# Patient Record
Sex: FEMALE | Race: WHITE | NOT HISPANIC OR LATINO | Employment: PART TIME | URBAN - METROPOLITAN AREA
[De-identification: names, ages, dates, MRNs, and addresses within clinical notes are randomized per-mention and may not be internally consistent; named-entity substitution may affect disease eponyms.]

---

## 2020-01-28 ENCOUNTER — OFFICE VISIT CONVERTED (OUTPATIENT)
Dept: FAMILY MEDICINE CLINIC | Facility: CLINIC | Age: 45
End: 2020-01-28
Attending: NURSE PRACTITIONER

## 2020-01-31 ENCOUNTER — HOSPITAL ENCOUNTER (OUTPATIENT)
Dept: URGENT CARE | Facility: CLINIC | Age: 45
Discharge: HOME | End: 2020-01-31
Attending: EMERGENCY MEDICINE

## 2020-02-01 ENCOUNTER — HOSPITAL ENCOUNTER (OUTPATIENT)
Dept: FAMILY MEDICINE CLINIC | Facility: CLINIC | Age: 45
Discharge: HOME | End: 2020-02-01

## 2020-02-01 ENCOUNTER — OFFICE VISIT CONVERTED (OUTPATIENT)
Dept: FAMILY MEDICINE CLINIC | Facility: CLINIC | Age: 45
End: 2020-02-01
Attending: FAMILY MEDICINE

## 2020-02-01 LAB
25(OH)D3 SERPL-MCNC: 36.3 NG/ML (ref 30–100)
ALBUMIN SERPL-MCNC: 3.8 G/DL (ref 3.5–5)
ALBUMIN/GLOB SERPL: 1.3 {RATIO} (ref 1.4–2.6)
ALP SERPL-CCNC: 59 U/L (ref 42–98)
ALT SERPL-CCNC: 18 U/L (ref 10–40)
ANION GAP SERPL CALC-SCNC: 17 MMOL/L (ref 8–19)
APPEARANCE UR: CLEAR
AST SERPL-CCNC: 18 U/L (ref 15–50)
BASOPHILS # BLD AUTO: 0.08 10*3/UL (ref 0–0.2)
BASOPHILS NFR BLD AUTO: 1.5 % (ref 0–3)
BILIRUB SERPL-MCNC: 0.59 MG/DL (ref 0.2–1.3)
BILIRUB UR QL: NEGATIVE
BUN SERPL-MCNC: 10 MG/DL (ref 5–25)
BUN/CREAT SERPL: 14 {RATIO} (ref 6–20)
CALCIUM SERPL-MCNC: 9.2 MG/DL (ref 8.7–10.4)
CHLORIDE SERPL-SCNC: 105 MMOL/L (ref 99–111)
CHOLEST SERPL-MCNC: 193 MG/DL (ref 107–200)
CHOLEST/HDLC SERPL: 5.5 {RATIO} (ref 3–6)
COLOR UR: YELLOW
CONV ABS IMM GRAN: 0.01 10*3/UL (ref 0–0.2)
CONV CO2: 22 MMOL/L (ref 22–32)
CONV COLLECTION SOURCE (UA): NORMAL
CONV IMMATURE GRAN: 0.2 % (ref 0–1.8)
CONV TOTAL PROTEIN: 6.7 G/DL (ref 6.3–8.2)
CONV UROBILINOGEN IN URINE BY AUTOMATED TEST STRIP: 0.2 {EHRLICHU}/DL (ref 0.1–1)
CREAT UR-MCNC: 0.74 MG/DL (ref 0.5–0.9)
DEPRECATED RDW RBC AUTO: 42.9 FL (ref 36.4–46.3)
EOSINOPHIL # BLD AUTO: 0.13 10*3/UL (ref 0–0.7)
EOSINOPHIL # BLD AUTO: 2.4 % (ref 0–7)
ERYTHROCYTE [DISTWIDTH] IN BLOOD BY AUTOMATED COUNT: 12.5 % (ref 11.7–14.4)
EST. AVERAGE GLUCOSE BLD GHB EST-MCNC: 105 MG/DL
GFR SERPLBLD BASED ON 1.73 SQ M-ARVRAT: >60 ML/MIN/{1.73_M2}
GLOBULIN UR ELPH-MCNC: 2.9 G/DL (ref 2–3.5)
GLUCOSE SERPL-MCNC: 87 MG/DL (ref 65–99)
GLUCOSE UR QL: NEGATIVE MG/DL
HBA1C MFR BLD: 5.3 % (ref 3.5–5.7)
HCT VFR BLD AUTO: 39.1 % (ref 37–47)
HDLC SERPL-MCNC: 35 MG/DL (ref 40–60)
HGB BLD-MCNC: 13.1 G/DL (ref 12–16)
HGB UR QL STRIP: NEGATIVE
KETONES UR QL STRIP: NEGATIVE MG/DL
LDLC SERPL CALC-MCNC: 114 MG/DL (ref 70–100)
LEUKOCYTE ESTERASE UR QL STRIP: NEGATIVE
LYMPHOCYTES # BLD AUTO: 1.82 10*3/UL (ref 1–5)
LYMPHOCYTES NFR BLD AUTO: 33.1 % (ref 20–45)
MCH RBC QN AUTO: 31.6 PG (ref 27–31)
MCHC RBC AUTO-ENTMCNC: 33.5 G/DL (ref 33–37)
MCV RBC AUTO: 94.2 FL (ref 81–99)
MONOCYTES # BLD AUTO: 0.38 10*3/UL (ref 0.2–1.2)
MONOCYTES NFR BLD AUTO: 6.9 % (ref 3–10)
NEUTROPHILS # BLD AUTO: 3.08 10*3/UL (ref 2–8)
NEUTROPHILS NFR BLD AUTO: 55.9 % (ref 30–85)
NITRITE UR QL STRIP: NEGATIVE
NRBC CBCN: 0 % (ref 0–0.7)
OSMOLALITY SERPL CALC.SUM OF ELEC: 288 MOSM/KG (ref 273–304)
PH UR STRIP.AUTO: 5 [PH] (ref 5–8)
PLATELET # BLD AUTO: 332 10*3/UL (ref 130–400)
PMV BLD AUTO: 9.8 FL (ref 9.4–12.3)
POTASSIUM SERPL-SCNC: 3.9 MMOL/L (ref 3.5–5.3)
PROT UR QL: NEGATIVE MG/DL
RBC # BLD AUTO: 4.15 10*6/UL (ref 4.2–5.4)
SODIUM SERPL-SCNC: 140 MMOL/L (ref 135–147)
SP GR UR: 1.02 (ref 1–1.03)
T4 FREE SERPL-MCNC: 0.7 NG/DL (ref 0.9–1.8)
TRIGL SERPL-MCNC: 218 MG/DL (ref 40–150)
TSH SERPL-ACNC: 2.94 M[IU]/L (ref 0.27–4.2)
VLDLC SERPL-MCNC: 44 MG/DL (ref 5–37)
WBC # BLD AUTO: 5.5 10*3/UL (ref 4.8–10.8)

## 2020-03-20 ENCOUNTER — HOSPITAL ENCOUNTER (OUTPATIENT)
Dept: SLEEP MEDICINE | Facility: HOSPITAL | Age: 45
Discharge: HOME | End: 2020-03-20
Attending: INTERNAL MEDICINE

## 2020-05-05 ENCOUNTER — CONVERSION ENCOUNTER (OUTPATIENT)
Dept: FAMILY MEDICINE CLINIC | Facility: CLINIC | Age: 45
End: 2020-05-05

## 2020-05-05 ENCOUNTER — OFFICE VISIT CONVERTED (OUTPATIENT)
Dept: FAMILY MEDICINE CLINIC | Facility: CLINIC | Age: 45
End: 2020-05-05
Attending: FAMILY MEDICINE

## 2020-07-08 ENCOUNTER — HOSPITAL ENCOUNTER (OUTPATIENT)
Dept: OTHER | Facility: HOSPITAL | Age: 45
Discharge: HOME | End: 2020-07-08

## 2020-07-13 LAB
CONV QUANTIFERON TB GOLD: NEGATIVE
QUANTIFERON CRITERIA: NORMAL
QUANTIFERON MITOGEN VALUE: >10 IU/ML
QUANTIFERON NIL VALUE: 0.01 IU/ML
QUANTIFERON TB1 AG VALUE: 0.09 IU/ML
QUANTIFERON TB2 AG VALUE: 0.01 IU/ML

## 2020-09-29 ENCOUNTER — HOSPITAL ENCOUNTER (OUTPATIENT)
Dept: FAMILY MEDICINE CLINIC | Facility: CLINIC | Age: 45
Discharge: HOME | End: 2020-09-29
Attending: NURSE PRACTITIONER

## 2020-10-01 LAB — SARS-COV-2 RNA SPEC QL NAA+PROBE: NOT DETECTED

## 2020-12-11 ENCOUNTER — HOSPITAL ENCOUNTER (OUTPATIENT)
Dept: SLEEP MEDICINE | Facility: HOSPITAL | Age: 45
Discharge: HOME | End: 2020-12-11
Attending: INTERNAL MEDICINE

## 2021-05-09 VITALS
DIASTOLIC BLOOD PRESSURE: 72 MMHG | SYSTOLIC BLOOD PRESSURE: 110 MMHG | TEMPERATURE: 97.7 F | BODY MASS INDEX: 41.82 KG/M2 | OXYGEN SATURATION: 96 % | HEART RATE: 90 BPM | HEIGHT: 63 IN | WEIGHT: 236 LBS

## 2021-05-09 VITALS
SYSTOLIC BLOOD PRESSURE: 120 MMHG | WEIGHT: 236 LBS | OXYGEN SATURATION: 97 % | HEART RATE: 89 BPM | TEMPERATURE: 96.7 F | DIASTOLIC BLOOD PRESSURE: 80 MMHG

## 2021-05-09 VITALS
DIASTOLIC BLOOD PRESSURE: 80 MMHG | OXYGEN SATURATION: 95 % | SYSTOLIC BLOOD PRESSURE: 120 MMHG | HEART RATE: 89 BPM | TEMPERATURE: 96.2 F

## 2021-05-14 ENCOUNTER — OFFICE VISIT CONVERTED (OUTPATIENT)
Dept: FAMILY MEDICINE CLINIC | Facility: CLINIC | Age: 46
End: 2021-05-14
Attending: FAMILY MEDICINE

## 2021-05-14 ENCOUNTER — HOSPITAL ENCOUNTER (OUTPATIENT)
Dept: FAMILY MEDICINE CLINIC | Facility: CLINIC | Age: 46
Discharge: HOME | End: 2021-05-14
Attending: FAMILY MEDICINE

## 2021-05-14 LAB
ALBUMIN SERPL-MCNC: 4.4 G/DL (ref 3.5–5)
ALBUMIN/GLOB SERPL: 1.5 {RATIO} (ref 1.4–2.6)
ALP SERPL-CCNC: 60 U/L (ref 42–98)
ALT SERPL-CCNC: 29 U/L (ref 10–40)
ANION GAP SERPL CALC-SCNC: 15 MMOL/L (ref 8–19)
AST SERPL-CCNC: 22 U/L (ref 15–50)
BASOPHILS # BLD AUTO: 0.06 10*3/UL (ref 0–0.2)
BASOPHILS NFR BLD AUTO: 1 % (ref 0–3)
BILIRUB SERPL-MCNC: 0.36 MG/DL (ref 0.2–1.3)
BUN SERPL-MCNC: 7 MG/DL (ref 5–25)
BUN/CREAT SERPL: 8 {RATIO} (ref 6–20)
CALCIUM SERPL-MCNC: 8.9 MG/DL (ref 8.7–10.4)
CHLORIDE SERPL-SCNC: 105 MMOL/L (ref 99–111)
CHOLEST SERPL-MCNC: 213 MG/DL (ref 107–200)
CHOLEST/HDLC SERPL: 4.6 {RATIO} (ref 3–6)
CONV ABS IMM GRAN: 0.02 10*3/UL (ref 0–0.2)
CONV CO2: 23 MMOL/L (ref 22–32)
CONV IMMATURE GRAN: 0.3 % (ref 0–1.8)
CONV TOTAL PROTEIN: 7.3 G/DL (ref 6.3–8.2)
CREAT UR-MCNC: 0.86 MG/DL (ref 0.5–0.9)
CRP SERPL-MCNC: 9.3 MG/L (ref 0–5)
DEPRECATED RDW RBC AUTO: 42.3 FL (ref 36.4–46.3)
EOSINOPHIL # BLD AUTO: 0.19 10*3/UL (ref 0–0.7)
EOSINOPHIL # BLD AUTO: 3.1 % (ref 0–7)
ERYTHROCYTE [DISTWIDTH] IN BLOOD BY AUTOMATED COUNT: 12.8 % (ref 11.7–14.4)
EST. AVERAGE GLUCOSE BLD GHB EST-MCNC: 108 MG/DL
GFR SERPLBLD BASED ON 1.73 SQ M-ARVRAT: >60 ML/MIN/{1.73_M2}
GLOBULIN UR ELPH-MCNC: 2.9 G/DL (ref 2–3.5)
GLUCOSE SERPL-MCNC: 98 MG/DL (ref 65–99)
HBA1C MFR BLD: 5.4 % (ref 3.5–5.7)
HCT VFR BLD AUTO: 39.1 % (ref 37–47)
HDLC SERPL-MCNC: 46 MG/DL (ref 40–60)
HGB BLD-MCNC: 13.3 G/DL (ref 12–16)
LDLC SERPL CALC-MCNC: 130 MG/DL (ref 70–100)
LYMPHOCYTES # BLD AUTO: 2.22 10*3/UL (ref 1–5)
LYMPHOCYTES NFR BLD AUTO: 36 % (ref 20–45)
MCH RBC QN AUTO: 30.6 PG (ref 27–31)
MCHC RBC AUTO-ENTMCNC: 34 G/DL (ref 33–37)
MCV RBC AUTO: 89.9 FL (ref 81–99)
MONOCYTES # BLD AUTO: 0.62 10*3/UL (ref 0.2–1.2)
MONOCYTES NFR BLD AUTO: 10.1 % (ref 3–10)
NEUTROPHILS # BLD AUTO: 3.05 10*3/UL (ref 2–8)
NEUTROPHILS NFR BLD AUTO: 49.5 % (ref 30–85)
NRBC CBCN: 0 % (ref 0–0.7)
OSMOLALITY SERPL CALC.SUM OF ELEC: 286 MOSM/KG (ref 273–304)
PLATELET # BLD AUTO: 291 10*3/UL (ref 130–400)
PMV BLD AUTO: 10 FL (ref 9.4–12.3)
POTASSIUM SERPL-SCNC: 3.9 MMOL/L (ref 3.5–5.3)
RBC # BLD AUTO: 4.35 10*6/UL (ref 4.2–5.4)
SODIUM SERPL-SCNC: 139 MMOL/L (ref 135–147)
T4 FREE SERPL-MCNC: 0.8 NG/DL (ref 0.9–1.8)
TRIGL SERPL-MCNC: 183 MG/DL (ref 40–150)
TSH SERPL-ACNC: 2.55 M[IU]/L (ref 0.27–4.2)
VLDLC SERPL-MCNC: 37 MG/DL (ref 5–37)
WBC # BLD AUTO: 6.16 10*3/UL (ref 4.8–10.8)

## 2021-05-16 LAB
CONV CELIAC DISEASE AB-IGA: 242 MG/DL (ref 68–408)
HCV AB SER DONR QL: <0.1 S/CO RATIO (ref 0–0.9)

## 2021-05-17 LAB — TTG IGA SER-ACNC: <2 U/ML (ref 0–3)

## 2021-05-18 ENCOUNTER — HOSPITAL ENCOUNTER (OUTPATIENT)
Dept: FAMILY MEDICINE CLINIC | Facility: CLINIC | Age: 46
Discharge: HOME | End: 2021-05-18
Attending: FAMILY MEDICINE

## 2021-06-05 NOTE — PROGRESS NOTES
Progress Note      Patient Name: Thalia Sandhu   Patient ID: [id number]   Sex: Female   YOB: 1975    Primary Care Provider: Mimi RAYA   Referring Provider: Mimi RAYA    Visit Date: May 14, 2021    Provider: James Vasquez DO   Location: [facility]   Location Address: [address] [place] [address]   [place]  [address]   Location Phone: [phone]          Chief Complaint     Wanting labs       History Of Present Illness  Thalia Sandhu is a 45 year old /White female who presents for evaluation and treatment of:      1. ) GI SXS : Patient presents with complaints of intermittent bouts of diarrhea and constipation. She reports that she is concerned for IBS. She denies any significant sxs at this time. She reports associated abdominal pain during episodes. She is also requesting labs evaluating her thyroid due to her daughter recently being diagnosed with hypothyroidism.     *Requesting a re-referral to dermatology - history of psoriasis - previously followed by [facility].            Past Medical History  Disease Name Date Onset Notes   Anemia --  --    Anxiety --  --    Depression --  --    GERD (gastroesophageal reflux disease) --  --    Migraines --  --    Psoriatic arthritis --  --    Screening Mammogram 06-18-19 [place] sent date of mammo, but no report   Sleep apnea --  --    Stomach ulcer --  --          Past Surgical History  Procedure Name Date Notes   Appendectomy --  --    Endometrial ablation --  --          Medication List  Name Date Started Instructions   fluoxetine 20 mg oral capsule  take 1 capsule (20 mg) by oral route once daily in the evening   hydroxyzine HCl 50 mg oral tablet  take 1 tablet (50 mg) by oral route 4 times per day   olanzapine 10 mg oral tablet  take 1 tablet (10 mg) by oral route once daily   omeprazole 20 mg oral capsule,delayed release(/EC) 05/13/2020 take 1 capsule (20 mg) by oral route  "once daily 30 minutes to 1 hour before a meal for 90 days         Allergy List  Allergen Name Date Reaction Notes   NO KNOWN DRUG ALLERGIES --  --  --          Family Medical History  Disease Name Relative/Age Notes   Hyperlipidemia Mother/   Mother   Hypertension Mother/   Mother   Mother, Grandmother, or Sister developed Heart Disease before the age of 65  --          Social History  Finding Status Start/Stop Quantity Notes   Alcohol Light --/-- --  drank alcohol in the past - rare   Electronic cigarette use --  --/-- --  --    Exercises regularly --  --/-- --  3-4 times per week   Recreational Drug Use Never --/-- --  never used   Second hand smoke exposure Unknown --/-- --  no   Tobacco Former --/-- --  for 20 years, currently uses other tobacco products - quit smoking 6-7 years ago   Uses seatbelts --  --/-- --  yes         Review of Systems  · Constitutional  o Denies  o : fatigue, night sweats  · Eyes  o Denies  o : double vision, blurred vision  · HENT  o Denies  o : vertigo, recent head injury  · Cardiovascular  o Denies  o : chest pain, irregular heart beats  · Respiratory  o Denies  o : shortness of breath, productive cough  · Gastrointestinal  o Denies  o : diarrhea, abdominal pain, nausea, vomiting   · Genitourinary  o Denies  o : dysuria, urinary retention  · Integument  o Denies  o : hair growth change, new skin lesions  · Neurologic  o Denies  o : altered mental status, seizures  · Musculoskeletal  o Denies  o : joint swelling, limitation of motion  · Endocrine  o Denies  o : cold intolerance, heat intolerance  · Psychiatric  o Denies  o : hallucinations, delusions  · Heme-Lymph  o Denies  o : petechiae, lymph node enlargement or tenderness  · Allergic-Immunologic  o Denies  o : frequent illnesses      Vitals  Date Time BP Position Site L\R Cuff Size HR RR TEMP (F) WT  HT  BMI kg/m2 BSA m2 O2 Sat FR L/min FiO2 HC       05/14/2021 03:48 /78 Sitting    79 - R  98 245lbs 0oz 5'  3\" 43.4 2.22 96 %  " 21%          Physical Examination  · Constitutional  o Appearance  o : alert, in no acute distress  · Head and Face  o HEENT  o : hearing is normal for conversation   · Eyes  o Conjunctivae  o : conjunctivae normal  o Pupils and Irises  o : pupils equal and round  · Neck  o Inspection/Palpation  o : supple  o Thyroid  o : no thyromegaly  · Respiratory  o Respiratory Effort  o : breathing unlabored  o Auscultation of Lungs  o : clear to ascultation  · Cardiovascular  o Heart  o :   § Auscultation of Heart  § : regular rate and rhythm  o Peripheral Vascular System  o :   § Extremities  § : no edema  · Lymphatic  o Neck  o : no lymphadenopathy present  · Musculoskeletal  o General  o :   § General Musculoskeletal  § : No joint swelling or deformity.  · Skin and Subcutaneous Tissue  o General Inspection  o : no lesions present, no areas of discoloration, skin turgor normal, texture normal  · Neurologic  o Gait and Station  o :   § Gait Screening  § : normal gait  · Psychiatric  o Mood and Affect  o : mood normal, affect appropriate          Assessment  · Irritable bowel     564.1/K58.9  · Psoriasis     696.1/L40.9  · Stage 1 hypertension     401.9/I10  · Screening for thyroid disorder     V77.0/Z13.29  · Screening for lipid disorders     V77.91/Z13.220  · Screening for diabetes mellitus     V77.1/Z13.1  · Encounter for hepatitis C screening test for low risk patient     V73.89/Z11.59  · Morbid obesity     278.01/E66.01         A.) Labs as noted. Referral to dermatology as noted. Advised that if labs are negative - we will consider a referral to GI.            Plan  · Orders  o CBC with Auto Diff [facility] (06002) - 401.9/I10 - 05/14/2021  o CMP [facility] (71642) - 401.9/I10 - 05/14/2021  o Hgb A1c [facility] (68384) - V77.1/Z13.1 - 05/14/2021  o Lipid Panel [facility] (55692) - V77.91/Z13.220 - 05/14/2021  o Thyroid Profile (THYII) - V77.0/Z13.29 - 05/14/2021  o ACO-39: Current medications updated and reviewed (0285F, ) - -  05/14/2021  o Celiac disease antibody panel (53715) - 564.1/K58.9 - 05/14/2021  o Hepatitis C antibody (85450) - V73.89/Z11.59 - 05/14/2021  o CRP (Inflammation) [facility] (60979) - 564.1/K58.9 - 05/14/2021  o DERMATOLOGY CONSULTATION (DERMA) - 696.1/L40.9 - 05/14/2021   Patient would like to be referred to [facility] if possible.   o Lactoferrin, fecal (25141) - 564.1/K58.9 - 05/14/2021  · Medications  o Medications have been Reconciled  o Transition of Care or Provider Policy  · Instructions  o Patient was educated/instructed on their diagnosis, treatment and medications prior to discharge from the clinic today.            Electronically Signed by: James Vasquez DO - on May 14, 2021 04:42:41 PM

## 2021-06-21 PROBLEM — K21.9 GERD (GASTROESOPHAGEAL REFLUX DISEASE): Status: ACTIVE | Noted: 2021-06-21

## 2021-06-21 PROBLEM — G47.30 SLEEP APNEA: Status: ACTIVE | Noted: 2021-06-21

## 2021-06-21 PROBLEM — D64.9 ANEMIA: Status: ACTIVE | Noted: 2021-06-21

## 2021-06-21 PROBLEM — G43.909 MIGRAINES: Status: ACTIVE | Noted: 2021-06-21

## 2021-06-21 PROBLEM — F41.9 ANXIETY: Status: ACTIVE | Noted: 2021-06-21

## 2021-06-21 PROBLEM — L40.50 PSORIASIS WITH ARTHROPATHY (HCC): Status: ACTIVE | Noted: 2021-06-21

## 2021-06-21 PROBLEM — F32.A DEPRESSION: Status: ACTIVE | Noted: 2021-06-21

## 2021-06-21 PROBLEM — Z12.31 VISIT FOR SCREENING MAMMOGRAM: Status: ACTIVE | Noted: 2019-06-18

## 2021-06-21 RX ORDER — OLANZAPINE 10 MG/1
TABLET ORAL
COMMUNITY

## 2021-06-21 RX ORDER — FLUOXETINE HYDROCHLORIDE 20 MG/1
CAPSULE ORAL
COMMUNITY

## 2021-06-21 RX ORDER — HYDROXYZINE 50 MG/1
TABLET, FILM COATED ORAL
COMMUNITY

## 2021-06-23 ENCOUNTER — OFFICE VISIT (OUTPATIENT)
Dept: GASTROENTEROLOGY | Facility: CLINIC | Age: 46
End: 2021-06-23

## 2021-06-23 VITALS
WEIGHT: 240 LBS | DIASTOLIC BLOOD PRESSURE: 62 MMHG | HEART RATE: 72 BPM | SYSTOLIC BLOOD PRESSURE: 111 MMHG | HEIGHT: 62 IN | BODY MASS INDEX: 44.16 KG/M2

## 2021-06-23 DIAGNOSIS — R19.7 DIARRHEA, UNSPECIFIED TYPE: Primary | ICD-10-CM

## 2021-06-23 DIAGNOSIS — Z86.010 HISTORY OF COLON POLYPS: ICD-10-CM

## 2021-06-23 PROBLEM — N93.9 ABNORMAL UTERINE BLEEDING (AUB): Status: ACTIVE | Noted: 2019-09-23

## 2021-06-23 PROBLEM — Z09 POSTOP CHECK: Status: ACTIVE | Noted: 2019-11-05

## 2021-06-23 PROBLEM — N92.0 MENORRHAGIA: Status: ACTIVE | Noted: 2019-08-14

## 2021-06-23 PROCEDURE — 99204 OFFICE O/P NEW MOD 45 MIN: CPT | Performed by: NURSE PRACTITIONER

## 2021-06-23 RX ORDER — SOD SULF/POT CHLORIDE/MAG SULF 1.479 G
188 TABLET ORAL CONTINUOUS
Qty: 24 TABLET | Refills: 0 | Status: ON HOLD | OUTPATIENT
Start: 2021-06-23 | End: 2021-10-28

## 2021-06-23 RX ORDER — DICYCLOMINE HYDROCHLORIDE 10 MG/1
10 CAPSULE ORAL 4 TIMES DAILY PRN
Qty: 120 CAPSULE | Refills: 3 | Status: SHIPPED | OUTPATIENT
Start: 2021-06-23 | End: 2021-09-20 | Stop reason: SDUPTHER

## 2021-06-23 RX ORDER — GUSELKUMAB 100 MG/ML
INJECTION SUBCUTANEOUS
COMMUNITY
Start: 2021-04-19

## 2021-06-23 RX ORDER — HYDROXYZINE PAMOATE 50 MG/1
CAPSULE ORAL
COMMUNITY
Start: 2021-06-18

## 2021-06-23 RX ORDER — USTEKINUMAB 90 MG/ML
INJECTION, SOLUTION SUBCUTANEOUS
COMMUNITY
Start: 2021-03-22

## 2021-06-23 NOTE — PROGRESS NOTES
"Patient Name: Thalia Sandhu   Visit Date: 2021   Patient ID: [id number]  Provider: ELVER Barry    Sex: female  Location:  Location Address:  Location Phone: [address] [place] [address] [phone]    YOB: 1975      Primary Care Provider Mimi Bush APRN      Referring Provider: No ref. provider found        Chief Complaint  Irritable Bowel Syndrome    History of Present Illness  Thalia Sandhu is a 46 y.o. who presents to [facility] on referral from No ref. provider found for a gastroenterology evaluation of Irritable Bowel Syndrome.    Ms. Sandhu reports that she has been dealing with IBS mixed with constipation and diarrhea for many years now.  She is currently in a \"diarrhea state\" as she is having a bowel movement 2-3 times daily, loose stool. Fecal urgency present with occasional episodes of fecal incontinence.   Has tried fiber, miralax, and many OTC teas in the past however no relief.  Abdominal cramping waxes and wanes.  Cramping is worse whenever she feels constipated. Denies any hematochezia, melena, or family hx of colon cancer.  Appetite and weight stable.    Last colonoscopy was 8 years ago in [place], unsure hospital name.  Reports that she had colon polyps and was told she needed a repeat in 5 years.    Labs Result Review Imaging    Past Medical History:   Diagnosis Date   • Irritable bowel syndrome        Past Surgical History:   Procedure Laterality Date   • ABLATION COLPOCLESIS     • APPENDECTOMY     •  SECTION           Current Outpatient Medications:   •  FLUoxetine (PROzac) 20 MG capsule, fluoxetine 20 mg oral capsule take 1 capsule (20 mg) by oral route once daily in the evening   Active, Disp: , Rfl:   •  hydrOXYzine (ATARAX) 50 MG tablet, hydroxyzine HCl 50 mg oral tablet take 1 tablet (50 mg) by oral route 4 times per day   Active, Disp: , Rfl:   •  OLANZapine (zyPREXA) 10 MG tablet, " "olanzapine 10 mg oral tablet take 1 tablet (10 mg) by oral route once daily   Active, Disp: , Rfl:   •  Tremfya 100 MG/ML solution pen-injector, , Disp: , Rfl:   •  hydrOXYzine pamoate (VISTARIL) 50 MG capsule, TAKE 1 CAPSULE BY MOUTH EVERY 6 HOURS AS NEEDED, Disp: , Rfl:   •  Sodium Sulfate-Mag Sulfate-KCl (Sutab) 6005-338-455 MG tablet, Take 188 mg by mouth Continuous., Disp: 24 tablet, Rfl: 0  •  Stelara 90 MG/ML solution prefilled syringe Injection, , Disp: , Rfl:      No Known Allergies    History reviewed. No pertinent family history.     Social History     Social History Narrative   • Not on file         Objective     Review of Systems   Gastrointestinal: Positive for diarrhea. Negative for anal bleeding and blood in stool.        Vital Signs:   /62   Pulse 72   Ht 157.5 cm (62\")   Wt 109 kg (240 lb)   BMI 43.90 kg/m²       Physical Exam  Constitutional:       General: She is not in acute distress.     Appearance: Normal appearance. She is well-developed and normal weight.   HENT:      Head: Normocephalic and atraumatic.   Eyes:      Conjunctiva/sclera: Conjunctivae normal.      Pupils: Pupils are equal, round, and reactive to light.      Visual Fields: Right eye visual fields normal and left eye visual fields normal.   Cardiovascular:      Rate and Rhythm: Normal rate and regular rhythm.      Heart sounds: Normal heart sounds.   Pulmonary:      Effort: Pulmonary effort is normal. No retractions.      Breath sounds: Normal breath sounds and air entry.   Abdominal:      General: Bowel sounds are normal. There is no distension.      Palpations: Abdomen is soft.      Tenderness: There is no abdominal tenderness.      Comments: No appreciable hepatosplenomegaly or ascites   Musculoskeletal:         General: Normal range of motion.      Cervical back: Normal range of motion and neck supple.   Skin:     General: Skin is warm and dry.   Neurological:      Mental Status: She is alert and oriented to person, " place, and time.   Psychiatric:         Mood and Affect: Mood and affect normal.         Behavior: Behavior normal.         Result Review :   The following data was reviewed by: ELVER Barry on 06/23/2021:  CMP    CMP 5/14/21   Glucose 98   BUN 7   Creatinine 0.86   Sodium 139   Potassium 3.9   Chloride 105   Calcium 8.9   Albumin 4.4   Total Bilirubin 0.36   Alkaline Phosphatase 60   AST (SGOT) 22   ALT (SGPT) 29           CBC w/diff    CBC w/Diff 5/14/21   WBC 6.16   RBC 4.35   Hemoglobin 13.3   Hematocrit 39.1   MCV 89.9   MCH 30.6   MCHC 34.0   RDW 12.8   Platelets 291   Neutrophil Rel % 49.5   Lymphocyte Rel % 36.0   Monocyte Rel % 10.1 (A)   Eosinophil Rel % 3.1   Basophil Rel % 1.0   (A) Abnormal value                    Assessment and Plan    Diagnoses and all orders for this visit:    1. Diarrhea, unspecified type (Primary)  -     Clostridium Difficile Toxin - Stool, Per Rectum; Future  -     Fecal Lactoferrin - Stool, Per Rectum; Future  -     Gastrointestinal Panel, PCR - Stool, Per Rectum; Future  -     Pancreatic Elastase, Fecal - Stool, Per Rectum; Future  -     Case Request; Standing  -     Case Request    2. History of colon polyps  -     Clostridium Difficile Toxin - Stool, Per Rectum; Future  -     Fecal Lactoferrin - Stool, Per Rectum; Future  -     Gastrointestinal Panel, PCR - Stool, Per Rectum; Future  -     Pancreatic Elastase, Fecal - Stool, Per Rectum; Future  -     Case Request; Standing  -     Case Request    Other orders  -     Follow Anesthesia Guidelines / Protocol; Future  -     Obtain Informed Consent; Future  -     Follow Anesthesia Guidelines / Protocol; Standing  -     Obtain Informed Consent; Standing  -     Verify NPO; Standing  -     Verify Bowel Prep Was Successful; Standing  -     Give Tap Water Enema If Bowel Prep Insufficient; Standing  -     Sodium Sulfate-Mag Sulfate-KCl (Sutab) 7076-769-166 MG tablet; Take 188 mg by mouth Continuous.  Dispense: 24 tablet;  Refill: 0      COLONOSCOPY (N/A)       Follow Up   Return for Follow up after procedure.  Patient was given instructions and counseling regarding her condition or for health maintenance advice. Please see specific information pulled into the AVS if appropriate.

## 2021-07-08 ENCOUNTER — LAB (OUTPATIENT)
Dept: LAB | Facility: HOSPITAL | Age: 46
End: 2021-07-08

## 2021-07-08 DIAGNOSIS — R19.7 DIARRHEA, UNSPECIFIED TYPE: ICD-10-CM

## 2021-07-08 DIAGNOSIS — Z86.010 HISTORY OF COLON POLYPS: ICD-10-CM

## 2021-07-08 LAB
027 TOXIN: NORMAL
C DIFF TOX GENS STL QL NAA+PROBE: NEGATIVE
LACTOFERRIN STL QL LA: NEGATIVE

## 2021-07-08 PROCEDURE — 82656 EL-1 FECAL QUAL/SEMIQ: CPT

## 2021-07-08 PROCEDURE — 83630 LACTOFERRIN FECAL (QUAL): CPT

## 2021-07-08 PROCEDURE — 87493 C DIFF AMPLIFIED PROBE: CPT

## 2021-07-13 LAB — ELASTASE PANC STL-MCNT: >500 UG ELAST./G

## 2021-07-15 VITALS
TEMPERATURE: 98 F | HEART RATE: 79 BPM | SYSTOLIC BLOOD PRESSURE: 134 MMHG | DIASTOLIC BLOOD PRESSURE: 78 MMHG | BODY MASS INDEX: 43.41 KG/M2 | WEIGHT: 245 LBS | HEIGHT: 63 IN | OXYGEN SATURATION: 96 %

## 2021-08-13 ENCOUNTER — LAB (OUTPATIENT)
Dept: LAB | Facility: HOSPITAL | Age: 46
End: 2021-08-13

## 2021-08-13 ENCOUNTER — TRANSCRIBE ORDERS (OUTPATIENT)
Dept: ADMINISTRATIVE | Facility: HOSPITAL | Age: 46
End: 2021-08-13

## 2021-08-13 DIAGNOSIS — L40.9 PSORIASIS: Primary | ICD-10-CM

## 2021-08-13 LAB
ALBUMIN SERPL-MCNC: 4.3 G/DL (ref 3.5–5.2)
ALBUMIN/GLOB SERPL: 1.6 G/DL
ALP SERPL-CCNC: 65 U/L (ref 39–117)
ALT SERPL W P-5'-P-CCNC: 19 U/L (ref 1–33)
ANION GAP SERPL CALCULATED.3IONS-SCNC: 11.2 MMOL/L (ref 5–15)
AST SERPL-CCNC: 13 U/L (ref 1–32)
BASOPHILS # BLD AUTO: 0.07 10*3/MM3 (ref 0–0.2)
BASOPHILS NFR BLD AUTO: 1.2 % (ref 0–1.5)
BILIRUB SERPL-MCNC: 0.5 MG/DL (ref 0–1.2)
BUN SERPL-MCNC: 5 MG/DL (ref 6–20)
BUN/CREAT SERPL: 6.9 (ref 7–25)
CALCIUM SPEC-SCNC: 8.9 MG/DL (ref 8.6–10.5)
CHLORIDE SERPL-SCNC: 108 MMOL/L (ref 98–107)
CO2 SERPL-SCNC: 22.8 MMOL/L (ref 22–29)
CREAT SERPL-MCNC: 0.72 MG/DL (ref 0.57–1)
DEPRECATED RDW RBC AUTO: 45 FL (ref 37–54)
EOSINOPHIL # BLD AUTO: 0.08 10*3/MM3 (ref 0–0.4)
EOSINOPHIL NFR BLD AUTO: 1.4 % (ref 0.3–6.2)
ERYTHROCYTE [DISTWIDTH] IN BLOOD BY AUTOMATED COUNT: 13.3 % (ref 12.3–15.4)
GFR SERPL CREATININE-BSD FRML MDRD: 88 ML/MIN/1.73
GLOBULIN UR ELPH-MCNC: 2.7 GM/DL
GLUCOSE SERPL-MCNC: 108 MG/DL (ref 65–99)
HCT VFR BLD AUTO: 41.3 % (ref 34–46.6)
HGB BLD-MCNC: 13.5 G/DL (ref 12–15.9)
IMM GRANULOCYTES # BLD AUTO: 0.01 10*3/MM3 (ref 0–0.05)
IMM GRANULOCYTES NFR BLD AUTO: 0.2 % (ref 0–0.5)
LYMPHOCYTES # BLD AUTO: 2.37 10*3/MM3 (ref 0.7–3.1)
LYMPHOCYTES NFR BLD AUTO: 42.1 % (ref 19.6–45.3)
MCH RBC QN AUTO: 30.3 PG (ref 26.6–33)
MCHC RBC AUTO-ENTMCNC: 32.7 G/DL (ref 31.5–35.7)
MCV RBC AUTO: 92.8 FL (ref 79–97)
MONOCYTES # BLD AUTO: 0.47 10*3/MM3 (ref 0.1–0.9)
MONOCYTES NFR BLD AUTO: 8.3 % (ref 5–12)
NEUTROPHILS NFR BLD AUTO: 2.63 10*3/MM3 (ref 1.7–7)
NEUTROPHILS NFR BLD AUTO: 46.8 % (ref 42.7–76)
NRBC BLD AUTO-RTO: 0 /100 WBC (ref 0–0.2)
PLATELET # BLD AUTO: 303 10*3/MM3 (ref 140–450)
PMV BLD AUTO: 10.5 FL (ref 6–12)
POTASSIUM SERPL-SCNC: 3.9 MMOL/L (ref 3.5–5.2)
PROT SERPL-MCNC: 7 G/DL (ref 6–8.5)
RBC # BLD AUTO: 4.45 10*6/MM3 (ref 3.77–5.28)
SODIUM SERPL-SCNC: 142 MMOL/L (ref 136–145)
WBC # BLD AUTO: 5.63 10*3/MM3 (ref 3.4–10.8)

## 2021-08-13 PROCEDURE — 86480 TB TEST CELL IMMUN MEASURE: CPT

## 2021-08-13 PROCEDURE — 80053 COMPREHEN METABOLIC PANEL: CPT

## 2021-08-13 PROCEDURE — 36415 COLL VENOUS BLD VENIPUNCTURE: CPT

## 2021-08-13 PROCEDURE — 85025 COMPLETE CBC W/AUTO DIFF WBC: CPT

## 2021-08-18 LAB
GAMMA INTERFERON BACKGROUND BLD IA-ACNC: 0.04 IU/ML
M TB IFN-G BLD-IMP: NEGATIVE
M TB IFN-G CD4+ BCKGRND COR BLD-ACNC: 0.05 IU/ML
M TB IFN-G CD4+CD8+ BCKGRND COR BLD-ACNC: 0.07 IU/ML
MITOGEN IGNF BLD-ACNC: >10 IU/ML
QUANTIFERON INCUBATION: NORMAL
SERVICE CMNT-IMP: NORMAL

## 2021-09-20 ENCOUNTER — TELEPHONE (OUTPATIENT)
Dept: GASTROENTEROLOGY | Facility: CLINIC | Age: 46
End: 2021-09-20

## 2021-09-20 RX ORDER — DICYCLOMINE HYDROCHLORIDE 10 MG/1
10 CAPSULE ORAL 4 TIMES DAILY PRN
Qty: 120 CAPSULE | Refills: 2 | Status: SHIPPED | OUTPATIENT
Start: 2021-09-20 | End: 2022-03-18 | Stop reason: SDUPTHER

## 2021-10-28 ENCOUNTER — HOSPITAL ENCOUNTER (OUTPATIENT)
Facility: HOSPITAL | Age: 46
Setting detail: HOSPITAL OUTPATIENT SURGERY
Discharge: HOME | End: 2021-10-28
Attending: INTERNAL MEDICINE | Admitting: INTERNAL MEDICINE

## 2021-10-28 ENCOUNTER — ANESTHESIA EVENT (OUTPATIENT)
Dept: GASTROENTEROLOGY | Facility: HOSPITAL | Age: 46
End: 2021-10-28

## 2021-10-28 ENCOUNTER — ANESTHESIA (OUTPATIENT)
Dept: GASTROENTEROLOGY | Facility: HOSPITAL | Age: 46
End: 2021-10-28

## 2021-10-28 VITALS
SYSTOLIC BLOOD PRESSURE: 115 MMHG | HEART RATE: 66 BPM | RESPIRATION RATE: 14 BRPM | HEIGHT: 62 IN | WEIGHT: 233.25 LBS | BODY MASS INDEX: 42.92 KG/M2 | TEMPERATURE: 98.1 F | DIASTOLIC BLOOD PRESSURE: 71 MMHG | OXYGEN SATURATION: 95 %

## 2021-10-28 DIAGNOSIS — R19.7 DIARRHEA, UNSPECIFIED TYPE: ICD-10-CM

## 2021-10-28 DIAGNOSIS — Z86.010 HISTORY OF COLON POLYPS: ICD-10-CM

## 2021-10-28 LAB — B-HCG UR QL: NEGATIVE

## 2021-10-28 PROCEDURE — 45385 COLONOSCOPY W/LESION REMOVAL: CPT | Performed by: INTERNAL MEDICINE

## 2021-10-28 PROCEDURE — 81025 URINE PREGNANCY TEST: CPT | Performed by: INTERNAL MEDICINE

## 2021-10-28 PROCEDURE — 88305 TISSUE EXAM BY PATHOLOGIST: CPT | Performed by: INTERNAL MEDICINE

## 2021-10-28 PROCEDURE — 25010000002 PROPOFOL 10 MG/ML EMULSION: Performed by: NURSE ANESTHETIST, CERTIFIED REGISTERED

## 2021-10-28 RX ORDER — PROPOFOL 10 MG/ML
VIAL (ML) INTRAVENOUS AS NEEDED
Status: DISCONTINUED | OUTPATIENT
Start: 2021-10-28 | End: 2021-10-28 | Stop reason: SURG

## 2021-10-28 RX ORDER — SODIUM CHLORIDE, SODIUM LACTATE, POTASSIUM CHLORIDE, CALCIUM CHLORIDE 600; 310; 30; 20 MG/100ML; MG/100ML; MG/100ML; MG/100ML
30 INJECTION, SOLUTION INTRAVENOUS CONTINUOUS
Status: DISCONTINUED | OUTPATIENT
Start: 2021-10-28 | End: 2021-10-28 | Stop reason: HOSPADM

## 2021-10-28 RX ORDER — LIDOCAINE HYDROCHLORIDE 20 MG/ML
INJECTION, SOLUTION INFILTRATION; PERINEURAL AS NEEDED
Status: DISCONTINUED | OUTPATIENT
Start: 2021-10-28 | End: 2021-10-28 | Stop reason: SURG

## 2021-10-28 RX ADMIN — LIDOCAINE HYDROCHLORIDE 100 MG: 20 INJECTION, SOLUTION INFILTRATION; PERINEURAL at 10:45

## 2021-10-28 RX ADMIN — SODIUM CHLORIDE, POTASSIUM CHLORIDE, SODIUM LACTATE AND CALCIUM CHLORIDE 30 ML/HR: 600; 310; 30; 20 INJECTION, SOLUTION INTRAVENOUS at 10:22

## 2021-10-28 RX ADMIN — PROPOFOL 100 MG: 10 INJECTION, EMULSION INTRAVENOUS at 10:45

## 2021-10-28 RX ADMIN — PROPOFOL 200 MCG/KG/MIN: 10 INJECTION, EMULSION INTRAVENOUS at 10:45

## 2021-10-28 NOTE — ANESTHESIA PREPROCEDURE EVALUATION
Anesthesia Evaluation     Patient summary reviewed and Nursing notes reviewed   no history of anesthetic complications:  NPO Solid Status: > 8 hours  NPO Liquid Status: > 2 hours           Airway   Mallampati: III  TM distance: >3 FB  Neck ROM: full  No difficulty expected  Dental      Pulmonary - normal exam    breath sounds clear to auscultation  (+) sleep apnea,   Cardiovascular - negative cardio ROS and normal exam  Exercise tolerance: good (4-7 METS)    Rhythm: regular        Neuro/Psych  (+) headaches, psychiatric history,     GI/Hepatic/Renal/Endo    (+)  GERD,      Musculoskeletal (-) negative ROS    Abdominal    Substance History - negative use     OB/GYN negative ob/gyn ROS         Other - negative ROS                     Anesthesia Plan    ASA 3     general   (Total IV Anesthesia    Patient understands anesthesia not responsible for dental damage.  )  intravenous induction     Anesthetic plan, all risks, benefits, and alternatives have been provided, discussed and informed consent has been obtained with: patient.    Plan discussed with CRNA.

## 2021-10-29 LAB
CYTO UR: NORMAL
LAB AP CASE REPORT: NORMAL
LAB AP CLINICAL INFORMATION: NORMAL
PATH REPORT.FINAL DX SPEC: NORMAL
PATH REPORT.GROSS SPEC: NORMAL

## 2021-11-01 ENCOUNTER — TELEPHONE (OUTPATIENT)
Dept: GASTROENTEROLOGY | Facility: CLINIC | Age: 46
End: 2021-11-01

## 2021-12-03 ENCOUNTER — TELEPHONE (OUTPATIENT)
Dept: GASTROENTEROLOGY | Facility: CLINIC | Age: 46
End: 2021-12-03

## 2022-01-17 ENCOUNTER — TELEMEDICINE (OUTPATIENT)
Dept: GASTROENTEROLOGY | Facility: CLINIC | Age: 47
End: 2022-01-17

## 2022-01-17 VITALS — HEIGHT: 62 IN | BODY MASS INDEX: 36.8 KG/M2 | WEIGHT: 200 LBS

## 2022-01-17 DIAGNOSIS — K58.0 IRRITABLE BOWEL SYNDROME WITH DIARRHEA: ICD-10-CM

## 2022-01-17 DIAGNOSIS — D12.4 ADENOMATOUS POLYP OF DESCENDING COLON: Primary | ICD-10-CM

## 2022-01-17 PROCEDURE — 99212 OFFICE O/P EST SF 10 MIN: CPT | Performed by: NURSE PRACTITIONER

## 2022-01-17 NOTE — PROGRESS NOTES
Chief Complaint  Follow-up (Colonoscopy )    History of Present Illness  Thalia Sandhu is a 46 y.o. who presents to [facility] for follow up of Follow-up (Colonoscopy ).    ..You have chosen to receive care through a telehealth visit.  Do you consent to use a video/audio connection for your medical care today? Yes    Ms. Sandhu presents today for follow-up colonoscopy.  Tubular adenoma found and completely removed in descending colon.  Patient reports that she is no longer having diarrhea.  Having several bowel movements throughout the week, formed stool.  Denies any hematochezia or melena.  Appetite and weight stable.    Labs Result Review Imaging    Past Medical History:   Diagnosis Date   • Abdominal pain    • Anxiety    • Depression    • Irritable bowel syndrome    • Psoriasis        Past Surgical History:   Procedure Laterality Date   • ABLATION COLPOCLESIS     • APPENDECTOMY     •  SECTION     • COLONOSCOPY N/A 10/28/2021    Procedure: COLONOSCOPY with random biopsy, POLYPECTOMY;  Surgeon: Shruti Guzmán MD;  Location: [facility] ENDOSCOPY;  Service: Gastroenterology;  Laterality: N/A;  COLON POLYP, DIVERTICULOSIS   • UPPER GASTROINTESTINAL ENDOSCOPY         Current Outpatient Medications on File Prior to Visit   Medication Sig Dispense Refill   • dicyclomine (BENTYL) 10 MG capsule Take 1 capsule by mouth 4 (Four) Times a Day As Needed (For abdominal pain and/or diarrhea). 120 capsule 2   • FLUoxetine (PROzac) 20 MG capsule fluoxetine 20 mg oral capsule take 1 capsule (20 mg) by oral route once daily in the evening   Active     • hydrOXYzine (ATARAX) 50 MG tablet hydroxyzine HCl 50 mg oral tablet take 1 tablet (50 mg) by oral route 4 times per day   Active     • hydrOXYzine pamoate (VISTARIL) 50 MG capsule TAKE 1 CAPSULE BY MOUTH EVERY 6 HOURS AS NEEDED     • OLANZapine (zyPREXA) 10 MG tablet olanzapine 10 mg oral tablet take 1 tablet (10 mg) by oral  "route once daily   Active     • Stelara 90 MG/ML solution prefilled syringe Injection      • Tremfya 100 MG/ML solution pen-injector Inject  as directed.       No current facility-administered medications on file prior to visit.       Social History     Social History Narrative   • Not on file         Objective     Review of Systems     Vital Signs:   Ht 157.5 cm (62\")   Wt 90.7 kg (200 lb)   BMI 36.58 kg/m²       Physical Exam    Result Review :   The following data was reviewed by: ELVER Barry on 01/17/2022:  CMP    CMP 5/14/21 8/13/21   Glucose 98 108 (A)   BUN 7 5 (A)   Creatinine 0.86 0.72   eGFR Non African Am  88   Sodium 139 142   Potassium 3.9 3.9   Chloride 105 108 (A)   Calcium 8.9 8.9   Albumin 4.4 4.30   Total Bilirubin 0.36 0.5   Alkaline Phosphatase 60 65   AST (SGOT) 22 13   ALT (SGPT) 29 19   (A) Abnormal value            CBC w/diff    CBC w/Diff 5/14/21 8/13/21   WBC 6.16 5.63   RBC 4.35 4.45   Hemoglobin 13.3 13.5   Hematocrit 39.1 41.3   MCV 89.9 92.8   MCH 30.6 30.3   MCHC 34.0 32.7   RDW 12.8 13.3   Platelets 291 303   Neutrophil Rel % 49.5 46.8   Immature Granulocyte Rel %  0.2   Lymphocyte Rel % 36.0 42.1   Monocyte Rel % 10.1 (A) 8.3   Eosinophil Rel % 3.1 1.4   Basophil Rel % 1.0 1.2   (A) Abnormal value            No results found for: IRON, TIBC, FERRITIN, LABIRON      Colonoscopy 10/28/2021: Perianal skin tags.  One 6 mm polyp in descending colon.  Mild diverticulosis of sigmoid colon.  Random colon biopsy-no significant pathologic change.  Descending colon polyp-tubular adenoma.     Assessment and Plan    Diagnoses and all orders for this visit:    1. Adenomatous polyp of descending colon (Primary)    2. Irritable bowel syndrome with diarrhea      * Surgery not found *       Recall 10/20/2026    Follow Up   Return if symptoms worsen or fail to improve.  Patient was given instructions and counseling regarding her condition or for health maintenance advice. Please see " specific information pulled into the AVS if appropriate.

## 2022-02-07 ENCOUNTER — TELEPHONE (OUTPATIENT)
Dept: FAMILY MEDICINE CLINIC | Facility: CLINIC | Age: 47
End: 2022-02-07

## 2022-03-18 RX ORDER — DICYCLOMINE HYDROCHLORIDE 10 MG/1
10 CAPSULE ORAL 4 TIMES DAILY PRN
Qty: 120 CAPSULE | Refills: 2 | Status: SHIPPED | OUTPATIENT
Start: 2022-03-18 | End: 2022-03-29

## 2022-03-29 RX ORDER — DICYCLOMINE HYDROCHLORIDE 10 MG/1
10 CAPSULE ORAL 4 TIMES DAILY PRN
Qty: 120 CAPSULE | Refills: 2 | Status: SHIPPED | OUTPATIENT
Start: 2022-03-29

## 2022-03-29 NOTE — TELEPHONE ENCOUNTER
Vannesa Velez from [employer] and patient is requesting the refill of dicyclomine sent through [employer]. Prescription has been updated.

## 2022-09-13 ENCOUNTER — LAB (OUTPATIENT)
Dept: LAB | Facility: HOSPITAL | Age: 47
End: 2022-09-13

## 2022-09-13 ENCOUNTER — TRANSCRIBE ORDERS (OUTPATIENT)
Dept: ADMINISTRATIVE | Facility: HOSPITAL | Age: 47
End: 2022-09-13

## 2022-09-13 DIAGNOSIS — L40.9 PSORIASIS: Primary | ICD-10-CM

## 2022-09-13 DIAGNOSIS — L40.9 PSORIASIS: ICD-10-CM

## 2022-09-13 LAB
ALBUMIN SERPL-MCNC: 4.6 G/DL (ref 3.5–5.2)
ALBUMIN/GLOB SERPL: 2.1 G/DL
ALP SERPL-CCNC: 53 U/L (ref 39–117)
ALT SERPL W P-5'-P-CCNC: 15 U/L (ref 1–33)
ANION GAP SERPL CALCULATED.3IONS-SCNC: 9.6 MMOL/L (ref 5–15)
AST SERPL-CCNC: 18 U/L (ref 1–32)
BASOPHILS # BLD AUTO: 0.07 10*3/MM3 (ref 0–0.2)
BASOPHILS NFR BLD AUTO: 1.2 % (ref 0–1.5)
BILIRUB SERPL-MCNC: 0.5 MG/DL (ref 0–1.2)
BUN SERPL-MCNC: 5 MG/DL (ref 6–20)
BUN/CREAT SERPL: 5.6 (ref 7–25)
CALCIUM SPEC-SCNC: 9.2 MG/DL (ref 8.6–10.5)
CHLORIDE SERPL-SCNC: 106 MMOL/L (ref 98–107)
CO2 SERPL-SCNC: 25.4 MMOL/L (ref 22–29)
CREAT SERPL-MCNC: 0.9 MG/DL (ref 0.57–1)
DEPRECATED RDW RBC AUTO: 44.7 FL (ref 37–54)
EGFRCR SERPLBLD CKD-EPI 2021: 80.5 ML/MIN/1.73
EOSINOPHIL # BLD AUTO: 0.13 10*3/MM3 (ref 0–0.4)
EOSINOPHIL NFR BLD AUTO: 2.3 % (ref 0.3–6.2)
ERYTHROCYTE [DISTWIDTH] IN BLOOD BY AUTOMATED COUNT: 13.2 % (ref 12.3–15.4)
GLOBULIN UR ELPH-MCNC: 2.2 GM/DL
GLUCOSE SERPL-MCNC: 88 MG/DL (ref 65–99)
HCT VFR BLD AUTO: 38.8 % (ref 34–46.6)
HGB BLD-MCNC: 13.2 G/DL (ref 12–15.9)
IMM GRANULOCYTES # BLD AUTO: 0.01 10*3/MM3 (ref 0–0.05)
IMM GRANULOCYTES NFR BLD AUTO: 0.2 % (ref 0–0.5)
LYMPHOCYTES # BLD AUTO: 2.59 10*3/MM3 (ref 0.7–3.1)
LYMPHOCYTES NFR BLD AUTO: 45.8 % (ref 19.6–45.3)
MCH RBC QN AUTO: 31.2 PG (ref 26.6–33)
MCHC RBC AUTO-ENTMCNC: 34 G/DL (ref 31.5–35.7)
MCV RBC AUTO: 91.7 FL (ref 79–97)
MONOCYTES # BLD AUTO: 0.42 10*3/MM3 (ref 0.1–0.9)
MONOCYTES NFR BLD AUTO: 7.4 % (ref 5–12)
NEUTROPHILS NFR BLD AUTO: 2.44 10*3/MM3 (ref 1.7–7)
NEUTROPHILS NFR BLD AUTO: 43.1 % (ref 42.7–76)
NRBC BLD AUTO-RTO: 0 /100 WBC (ref 0–0.2)
PLATELET # BLD AUTO: 289 10*3/MM3 (ref 140–450)
PMV BLD AUTO: 10.1 FL (ref 6–12)
POTASSIUM SERPL-SCNC: 4.1 MMOL/L (ref 3.5–5.2)
PROT SERPL-MCNC: 6.8 G/DL (ref 6–8.5)
RBC # BLD AUTO: 4.23 10*6/MM3 (ref 3.77–5.28)
SODIUM SERPL-SCNC: 141 MMOL/L (ref 136–145)
WBC NRBC COR # BLD: 5.66 10*3/MM3 (ref 3.4–10.8)

## 2022-09-13 PROCEDURE — 86480 TB TEST CELL IMMUN MEASURE: CPT

## 2022-09-13 PROCEDURE — 80053 COMPREHEN METABOLIC PANEL: CPT

## 2022-09-13 PROCEDURE — 85025 COMPLETE CBC W/AUTO DIFF WBC: CPT

## 2022-09-13 PROCEDURE — 36415 COLL VENOUS BLD VENIPUNCTURE: CPT

## 2022-09-16 LAB
GAMMA INTERFERON BACKGROUND BLD IA-ACNC: 0 IU/ML
M TB IFN-G BLD-IMP: NEGATIVE
M TB IFN-G CD4+ BCKGRND COR BLD-ACNC: 0.01 IU/ML
M TB IFN-G CD4+CD8+ BCKGRND COR BLD-ACNC: 0.01 IU/ML
MITOGEN IGNF BLD-ACNC: >10 IU/ML
QUANTIFERON INCUBATION: NORMAL
SERVICE CMNT-IMP: NORMAL

## 2023-02-23 RX ORDER — DICYCLOMINE HYDROCHLORIDE 10 MG/1
10 CAPSULE ORAL 4 TIMES DAILY PRN
Qty: 120 CAPSULE | Refills: 2 | OUTPATIENT
Start: 2023-02-23

## 2023-02-23 NOTE — TELEPHONE ENCOUNTER
Pt is requesting dicyclomine #[id number]. Order pended.   Last ov:6/23/21  Last refill: 3/29/22  Next ov: none currently scheduled

## (undated) DEVICE — Device: Brand: DEFENDO AIR/WATER/SUCTION AND BIOPSY VALVE

## (undated) DEVICE — SNAR POLYP CAPTIFLEX XS/OVL 11X2.4MM 240CM 1P/U

## (undated) DEVICE — THE SINGLE USE ETRAP – POLYP TRAP IS USED FOR SUCTION RETRIEVAL OF ENDOSCOPICALLY REMOVED POLYPS.: Brand: ETRAP

## (undated) DEVICE — SOL IRRG H2O PL/BG 1000ML STRL

## (undated) DEVICE — INST FRCP BIOPSY RADIALJAW4 W NDL 2.8MM 240CM JUMBO BX40 ORN

## (undated) DEVICE — COLON KIT: Brand: MEDLINE INDUSTRIES, INC.